# Patient Record
Sex: MALE | Race: WHITE | NOT HISPANIC OR LATINO | Employment: FULL TIME | URBAN - METROPOLITAN AREA
[De-identification: names, ages, dates, MRNs, and addresses within clinical notes are randomized per-mention and may not be internally consistent; named-entity substitution may affect disease eponyms.]

---

## 2023-01-31 ENCOUNTER — OFFICE VISIT (OUTPATIENT)
Dept: FAMILY MEDICINE | Facility: CLINIC | Age: 23
End: 2023-01-31
Payer: COMMERCIAL

## 2023-01-31 VITALS
TEMPERATURE: 98.7 F | WEIGHT: 192.9 LBS | OXYGEN SATURATION: 96 % | DIASTOLIC BLOOD PRESSURE: 76 MMHG | RESPIRATION RATE: 20 BRPM | HEART RATE: 100 BPM | SYSTOLIC BLOOD PRESSURE: 119 MMHG

## 2023-01-31 DIAGNOSIS — J06.9 VIRAL UPPER RESPIRATORY TRACT INFECTION WITH COUGH: Primary | ICD-10-CM

## 2023-01-31 DIAGNOSIS — Z20.2 CHLAMYDIA CONTACT: ICD-10-CM

## 2023-01-31 LAB
DEPRECATED S PYO AG THROAT QL EIA: NEGATIVE
GROUP A STREP BY PCR: NOT DETECTED

## 2023-01-31 PROCEDURE — U0003 INFECTIOUS AGENT DETECTION BY NUCLEIC ACID (DNA OR RNA); SEVERE ACUTE RESPIRATORY SYNDROME CORONAVIRUS 2 (SARS-COV-2) (CORONAVIRUS DISEASE [COVID-19]), AMPLIFIED PROBE TECHNIQUE, MAKING USE OF HIGH THROUGHPUT TECHNOLOGIES AS DESCRIBED BY CMS-2020-01-R: HCPCS | Performed by: PHYSICIAN ASSISTANT

## 2023-01-31 PROCEDURE — 99203 OFFICE O/P NEW LOW 30 MIN: CPT | Mod: CS | Performed by: PHYSICIAN ASSISTANT

## 2023-01-31 PROCEDURE — U0005 INFEC AGEN DETEC AMPLI PROBE: HCPCS | Performed by: PHYSICIAN ASSISTANT

## 2023-01-31 PROCEDURE — 87591 N.GONORRHOEAE DNA AMP PROB: CPT | Performed by: PHYSICIAN ASSISTANT

## 2023-01-31 PROCEDURE — 87651 STREP A DNA AMP PROBE: CPT | Performed by: PHYSICIAN ASSISTANT

## 2023-01-31 PROCEDURE — 87491 CHLMYD TRACH DNA AMP PROBE: CPT | Performed by: PHYSICIAN ASSISTANT

## 2023-01-31 RX ORDER — DOXYCYCLINE 100 MG/1
100 CAPSULE ORAL 2 TIMES DAILY
Qty: 14 CAPSULE | Refills: 0 | Status: SHIPPED | OUTPATIENT
Start: 2023-01-31 | End: 2023-02-07

## 2023-01-31 NOTE — LETTER
THI[facility] [address] [address]  [phone]      January 31, 2023    RE:  Fly Gardner                                                                                                                                                       [address] [place] [id number]            To whom it may concern:    Fly Gardner was seen in clinic today. Please excuse from work tomorrow due to medical illness.        Sincerely,        OMEGA Monteiro [facility]

## 2023-02-01 LAB
C TRACH DNA SPEC QL PROBE+SIG AMP: POSITIVE
N GONORRHOEA DNA SPEC QL NAA+PROBE: NEGATIVE
SARS-COV-2 RNA RESP QL NAA+PROBE: NEGATIVE

## 2023-02-01 NOTE — PATIENT INSTRUCTIONS
Viral URI:  Patient was educated on the natural course of viral illness which typically lasts up to 10 days.  Conservative measures discussed including increased fluids, nasal saline irrigation (neti pot), warm steamy shower, salt water gargles, cough suppressants, expectorants (Mucinex), and analgesics (Tylenol and/or Ibuprofen). See your primary care provider if symptoms worsen or do not improve in 7 days. Seek emergency care if you develop fever over 104 or shortness of breath.      Chlamydia exposure:  G/C pending. Treat with Doxycycline. No sex for one week. See your primary care provider if symptoms worsen or do not improve in 7 days. Seek emergency care if you develop severe testicular pain.

## 2023-02-01 NOTE — PROGRESS NOTES
URGENT CARE VISIT:    SUBJECTIVE:   Fly Gardner is a 22 year old male presenting with a chief complaint of stuffy nose, cough - productive and sore throat. Onset was 3 day(s) ago. He denies the following symptoms: shortness of breath, vomiting and diarrhea. Course of illness is same.    Treatment measures tried include None tried with no relief of symptoms. Predisposing factors include none.    Also here for continued testicular pain. He finished chlamydia treatment but had sex with partner who has chlamydia. Denies urinary frequency or penile discharge.     PMH: History reviewed. No pertinent past medical history.  Allergies: Patient has no known allergies.   Medications:   Current Outpatient Medications   Medication Sig Dispense Refill     doxycycline hyclate (VIBRAMYCIN) 100 MG capsule Take 1 capsule (100 mg) by mouth 2 times daily for 7 days 14 capsule 0     Social History:   Social History     Tobacco Use     Smoking status: Every Day     Types: Cigarettes, Vaping Device     Smokeless tobacco: Never   Substance Use Topics     Alcohol use: Not on file       ROS:  Review of systems negative except as stated above.    OBJECTIVE:  /76 (BP Location: Right arm, Patient Position: Sitting, Cuff Size: Adult Regular)   Pulse 100   Temp 98.7  F (37.1  C) (Oral)   Resp 20   Wt 87.5 kg (192 lb 14.4 oz)   SpO2 96%   GENERAL APPEARANCE: healthy, alert and no distress  EYES: EOMI,  PERRL, conjunctiva clear  HENT: ear canals and TM's normal.  Nose and mouth without ulcers, erythema or lesions  NECK: supple, nontender, no lymphadenopathy  RESP: lungs clear to auscultation - no rales, rhonchi or wheezes  CV: regular rates and rhythm, normal S1 S2, no murmur noted  SKIN: no suspicious lesions or rashes    Labs:    Results for orders placed or performed in visit on 01/31/23   Streptococcus A Rapid Screen w/Reflex to PCR - Clinic Collect     Status: Normal    Specimen: Throat; Swab   Result Value Ref Range    Group A  Strep antigen Negative Negative       ASSESSMENT:    ICD-10-CM    1. Viral upper respiratory tract infection with cough  J06.9 Symptomatic COVID-19 Virus (Coronavirus) by PCR      2. Chlamydia contact  Z20.2 Streptococcus A Rapid Screen w/Reflex to PCR - Clinic Collect     Chlamydia & Gonorrhea by PCR, [facility] - Clinic Collect     Group A Streptococcus PCR Throat Swab     doxycycline hyclate (VIBRAMYCIN) 100 MG capsule          PLAN:  Patient Instructions   Viral URI:  Patient was educated on the natural course of viral illness which typically lasts up to 10 days.  Conservative measures discussed including increased fluids, nasal saline irrigation (neti pot), warm steamy shower, salt water gargles, cough suppressants, expectorants (Mucinex), and analgesics (Tylenol and/or Ibuprofen). See your primary care provider if symptoms worsen or do not improve in 7 days. Seek emergency care if you develop fever over 104 or shortness of breath.      Chlamydia exposure:  G/C pending. Treat with Doxycycline. No sex for one week. See your primary care provider if symptoms worsen or do not improve in 7 days. Seek emergency care if you develop severe testicular pain.  Patient verbalized understanding and is agreeable to plan. The patient was discharged ambulatory and in stable condition.    Geeta Kenny PA-C ....................  1/31/2023   7:00 PM

## 2023-04-11 ENCOUNTER — OFFICE VISIT (OUTPATIENT)
Dept: FAMILY MEDICINE | Facility: CLINIC | Age: 23
End: 2023-04-11
Payer: COMMERCIAL

## 2023-04-11 VITALS
OXYGEN SATURATION: 98 % | TEMPERATURE: 98.1 F | HEART RATE: 94 BPM | SYSTOLIC BLOOD PRESSURE: 112 MMHG | RESPIRATION RATE: 16 BRPM | WEIGHT: 199 LBS | DIASTOLIC BLOOD PRESSURE: 74 MMHG

## 2023-04-11 DIAGNOSIS — Z11.3 SCREEN FOR STD (SEXUALLY TRANSMITTED DISEASE): Primary | ICD-10-CM

## 2023-04-11 PROCEDURE — 99213 OFFICE O/P EST LOW 20 MIN: CPT | Performed by: PHYSICIAN ASSISTANT

## 2023-04-11 PROCEDURE — 87591 N.GONORRHOEAE DNA AMP PROB: CPT | Performed by: PHYSICIAN ASSISTANT

## 2023-04-11 PROCEDURE — 87491 CHLMYD TRACH DNA AMP PROBE: CPT | Performed by: PHYSICIAN ASSISTANT

## 2023-04-11 RX ORDER — QUETIAPINE FUMARATE 25 MG/1
TABLET, FILM COATED ORAL
COMMUNITY
Start: 2023-02-18

## 2023-04-11 RX ORDER — TRAZODONE HYDROCHLORIDE 150 MG/1
TABLET ORAL
COMMUNITY
Start: 2023-02-18

## 2023-04-11 RX ORDER — HYDROXYZINE HYDROCHLORIDE 25 MG/1
TABLET, FILM COATED ORAL
COMMUNITY
Start: 2023-02-18

## 2023-04-11 RX ORDER — ALBUTEROL SULFATE 90 UG/1
AEROSOL, METERED RESPIRATORY (INHALATION)
COMMUNITY
Start: 2022-06-08

## 2023-04-11 RX ORDER — CLONIDINE HYDROCHLORIDE 0.1 MG/1
TABLET ORAL
COMMUNITY
Start: 2023-04-06

## 2023-04-12 ENCOUNTER — TELEPHONE (OUTPATIENT)
Dept: FAMILY MEDICINE | Facility: CLINIC | Age: 23
End: 2023-04-12
Payer: COMMERCIAL

## 2023-04-12 LAB
C TRACH DNA SPEC QL PROBE+SIG AMP: NEGATIVE
N GONORRHOEA DNA SPEC QL NAA+PROBE: NEGATIVE

## 2023-04-12 NOTE — PROGRESS NOTES
URGENT CARE VISIT:    SUBJECTIVE:    Fly Gardner is a 22 year old male who  presents today for STD screening. Symptoms of dysuria have been going on for 1 day(s). Symptoms were gradual onset and mild.  Patient denies back pain, nausea, vomiting, fever and chills or penile discharge. This patient was exposed to GF who had chlamydia but he was treated. He thinks he might have it again.    PMH: History reviewed. No pertinent past medical history.  Allergies: Patient has no known allergies.   Medications:   Current Outpatient Medications   Medication Sig Dispense Refill     cloNIDine (CATAPRES) 0.1 MG tablet        hydrOXYzine (ATARAX) 25 MG tablet        PROAIR  (90 Base) MCG/ACT inhaler        QUEtiapine (SEROQUEL) 25 MG tablet        traZODone (DESYREL) 150 MG tablet        Social History:   Social History     Tobacco Use     Smoking status: Every Day     Types: Cigarettes, Vaping Device     Smokeless tobacco: Never   Vaping Use     Vaping status: Not on file   Substance Use Topics     Alcohol use: Not on file       ROS:  Review of systems negative except as stated above.    OBJECTIVE:  /74 (BP Location: Right arm, Patient Position: Sitting, Cuff Size: Adult Large)   Pulse 94   Temp 98.1  F (36.7  C) (Oral)   Resp 16   Wt 90.3 kg (199 lb)   SpO2 98%   GENERAL APPEARANCE: healthy, alert and no distress  RESP: lungs clear to auscultation - no rales, rhonchi or wheezes  CV: regular rates and rhythm, normal S1 S2, no murmur noted  ABDOMEN:  soft, nontender, no HSM or masses and bowel sounds normal  BACK: No CVA tenderness  SKIN: no suspicious lesions or rashes      ASSESSMENT:     ICD-10-CM    1. Screen for STD (sexually transmitted disease)  Z11.3 Chlamydia & Gonorrhea by PCR, [facility] - Clinic Collect          PLAN:  Patient Instructions   Patient wants to retest for G/C . He was treated for chlamydia and is worried he has it again. He would like to wait for results prior to any necessary  treatment. Conservative measures discussed including safe sex practices. They might also be infected even if they have no symptoms. To prevent reinfection use a condom every time you have sex. See your primary care provider if symptoms worsen or do not improve in 7 days. Seek emergency care if you develop severe pelvic pain.      Patient verbalized understanding and is agreeable to plan. The patient was discharged ambulatory and in stable condition.    Geeta Kenny PA-C on 4/11/2023 at 7:47 PM

## 2023-04-12 NOTE — PATIENT INSTRUCTIONS
Patient wants to retest for G/C . He was treated for chlamydia and is worried he has it again. He would like to wait for results prior to any necessary treatment. Conservative measures discussed including safe sex practices. They might also be infected even if they have no symptoms. To prevent reinfection use a condom every time you have sex. See your primary care provider if symptoms worsen or do not improve in 7 days. Seek emergency care if you develop severe pelvic pain.

## 2023-04-12 NOTE — TELEPHONE ENCOUNTER
----- Message from Maryjo Kaur MD sent at 4/12/2023  1:30 PM CDT -----  Negative for gonorrhea and chlamydia, please notify patient.    Maryjo Kaur M.D. 4/12/2023 1:30 PM

## 2023-04-12 NOTE — TELEPHONE ENCOUNTER
Called and spoke with patient. Relayed message. No questions.    Rashida Farrell MA on 4/12/2023 at 1:42 PM

## 2023-08-27 ENCOUNTER — OFFICE VISIT (OUTPATIENT)
Dept: FAMILY MEDICINE | Facility: CLINIC | Age: 23
End: 2023-08-27
Payer: COMMERCIAL

## 2023-08-27 VITALS
SYSTOLIC BLOOD PRESSURE: 121 MMHG | WEIGHT: 191 LBS | RESPIRATION RATE: 18 BRPM | OXYGEN SATURATION: 97 % | TEMPERATURE: 97.6 F | HEART RATE: 80 BPM | DIASTOLIC BLOOD PRESSURE: 76 MMHG

## 2023-08-27 DIAGNOSIS — J01.90 ACUTE NON-RECURRENT SINUSITIS, UNSPECIFIED LOCATION: Primary | ICD-10-CM

## 2023-08-27 PROCEDURE — 99213 OFFICE O/P EST LOW 20 MIN: CPT | Performed by: PHYSICIAN ASSISTANT

## 2023-08-27 RX ORDER — ALBUTEROL SULFATE 90 UG/1
2 AEROSOL, METERED RESPIRATORY (INHALATION) EVERY 6 HOURS PRN
Qty: 18 G | Refills: 0 | Status: SHIPPED | OUTPATIENT
Start: 2023-08-27

## 2023-08-27 ASSESSMENT — ENCOUNTER SYMPTOMS
SORE THROAT: 1
DIARRHEA: 0
SINUS PAIN: 1
VOMITING: 0
WHEEZING: 1
FEVER: 0
SHORTNESS OF BREATH: 1
COUGH: 1

## 2023-08-27 NOTE — PROGRESS NOTES
Patient presents with:  Sinus Problem: Sinus pressure/pain and headache for the past 2 weeks. Negative covid testing.      Clinical Decision Making:  Sick sxs x 2 weeks. Sinus pain and pressure. Patient started on Augmentin for tx of suspected bacterial sinusitis. Patient reported wheezing; none noted on exam. Recommend refilling Albuterol to have it on hand. PE otherwise benign.       ICD-10-CM    1. Acute non-recurrent sinusitis, unspecified location  J01.90 amoxicillin-clavulanate (AUGMENTIN) 875-125 MG tablet     albuterol (PROAIR HFA/PROVENTIL HFA/VENTOLIN HFA) 108 (90 Base) MCG/ACT inhaler          Patient Instructions   1.  Take antibiotic according to bottle instructions with food to avoid stomach upset.   2.  I recommend using Mucinex to help thin mucus secretions.  Adding a saline nasal spray can also be helpful with clearing sinus congestion.  3.  Take Advil or Tylenol as needed for pain or fever control.  4.  Take Albuterol as needed for wheezing, coughing spells, or shortness of breath. You can use this up to every 4-6 hours.   5.  Follow-up if you not having any improvement in your symptoms over the next 5 days.      HPI:  Fly Gardner is a 22 year old male who presents today complaining of ST, sinus pain and pressure x 2 weeks. Patient reports hx of asthma and he has felt wheezy. He has an inhaler, but hasn't used it since [employer]. His at home COVID test was negative 1 week ago. He has been taking some OTC Benadryl. See ROS below.    History obtained from the patient.    Problem List:  There are no relevant problems documented for this patient.      No past medical history on file.    Social History     Tobacco Use    Smoking status: Every Day     Types: Cigarettes, Vaping Device    Smokeless tobacco: Never   Substance Use Topics    Alcohol use: Not on file       Review of Systems   Constitutional:  Negative for fever.   HENT:  Positive for congestion, postnasal drip, sinus pain and sore throat.     Respiratory:  Positive for cough, shortness of breath and wheezing.    Gastrointestinal:  Negative for diarrhea and vomiting.   Allergic/Immunologic: Negative for environmental allergies.       Vitals:    08/27/23 1856   BP: 121/76   Pulse: 80   Resp: 18   Temp: 97.6  F (36.4  C)   SpO2: 97%   Weight: 86.6 kg (191 lb)       Physical Exam  Vitals and nursing note reviewed.   Constitutional:       General: He is not in acute distress.     Appearance: He is not toxic-appearing or diaphoretic.   HENT:      Head: Normocephalic and atraumatic.      Right Ear: Tympanic membrane, ear canal and external ear normal.      Left Ear: Tympanic membrane, ear canal and external ear normal.      Mouth/Throat:      Pharynx: No oropharyngeal exudate or posterior oropharyngeal erythema.   Eyes:      Conjunctiva/sclera: Conjunctivae normal.   Cardiovascular:      Rate and Rhythm: Normal rate and regular rhythm.      Heart sounds: No murmur heard.  Pulmonary:      Effort: Pulmonary effort is normal. No respiratory distress.      Breath sounds: No stridor. No wheezing, rhonchi or rales.   Lymphadenopathy:      Cervical: No cervical adenopathy.   Neurological:      Mental Status: He is alert.   Psychiatric:         Mood and Affect: Mood normal.         Behavior: Behavior normal.         Thought Content: Thought content normal.         Judgment: Judgment normal.     At the end of the encounter, I discussed results, diagnosis, medications. Discussed red flags for immediate return to clinic/ER, as well as indications for follow up if no improvement. Patient understood and agreed to plan. Patient was stable for discharge.

## 2023-08-28 NOTE — PATIENT INSTRUCTIONS
1.  Take antibiotic according to bottle instructions with food to avoid stomach upset.   2.  I recommend using Mucinex to help thin mucus secretions.  Adding a saline nasal spray can also be helpful with clearing sinus congestion.  3.  Take Advil or Tylenol as needed for pain or fever control.  4.  Take Albuterol as needed for wheezing, coughing spells, or shortness of breath. You can use this up to every 4-6 hours.   5.  Follow-up if you not having any improvement in your symptoms over the next 5 days.